# Patient Record
Sex: MALE | Race: BLACK OR AFRICAN AMERICAN | NOT HISPANIC OR LATINO | URBAN - METROPOLITAN AREA
[De-identification: names, ages, dates, MRNs, and addresses within clinical notes are randomized per-mention and may not be internally consistent; named-entity substitution may affect disease eponyms.]

---

## 2020-07-20 ENCOUNTER — APPOINTMENT (RX ONLY)
Dept: URBAN - METROPOLITAN AREA CLINIC 1 | Facility: CLINIC | Age: 50
Setting detail: DERMATOLOGY
End: 2020-07-20

## 2020-07-20 DIAGNOSIS — L21.8 OTHER SEBORRHEIC DERMATITIS: ICD-10-CM

## 2020-07-20 DIAGNOSIS — Q828 OTHER SPECIFIED ANOMALIES OF SKIN: ICD-10-CM

## 2020-07-20 DIAGNOSIS — B07.8 OTHER VIRAL WARTS: ICD-10-CM

## 2020-07-20 DIAGNOSIS — Q826 OTHER SPECIFIED ANOMALIES OF SKIN: ICD-10-CM

## 2020-07-20 DIAGNOSIS — Q819 OTHER SPECIFIED ANOMALIES OF SKIN: ICD-10-CM

## 2020-07-20 PROBLEM — L85.8 OTHER SPECIFIED EPIDERMAL THICKENING: Status: ACTIVE | Noted: 2020-07-20

## 2020-07-20 PROBLEM — D23.72 OTHER BENIGN NEOPLASM OF SKIN OF LEFT LOWER LIMB, INCLUDING HIP: Status: ACTIVE | Noted: 2020-07-20

## 2020-07-20 PROCEDURE — 99203 OFFICE O/P NEW LOW 30 MIN: CPT | Mod: 25

## 2020-07-20 PROCEDURE — 17110 DESTRUCTION B9 LES UP TO 14: CPT

## 2020-07-20 PROCEDURE — ? BENIGN DESTRUCTION

## 2020-07-20 PROCEDURE — ? COUNSELING

## 2020-07-20 PROCEDURE — ? PRESCRIPTION

## 2020-07-20 PROCEDURE — ? ADDITIONAL NOTES

## 2020-07-20 PROCEDURE — ? TREATMENT REGIMEN

## 2020-07-20 RX ORDER — HYDROCORTISONE 25 MG/G
CREAM TOPICAL
Qty: 1 | Refills: 2 | Status: ERX | COMMUNITY
Start: 2020-07-20

## 2020-07-20 RX ORDER — CICLOPIROX 10 MG/.96ML
SHAMPOO TOPICAL
Qty: 1 | Refills: 3 | Status: ERX | COMMUNITY
Start: 2020-07-20

## 2020-07-20 RX ORDER — FLUOCINOLONE ACETONIDE 0.11 MG/ML
OIL TOPICAL
Qty: 1 | Refills: 3 | Status: ERX | COMMUNITY
Start: 2020-07-20

## 2020-07-20 RX ADMIN — FLUOCINOLONE ACETONIDE: 0.11 OIL TOPICAL at 00:00

## 2020-07-20 RX ADMIN — CICLOPIROX: 10 SHAMPOO TOPICAL at 00:00

## 2020-07-20 RX ADMIN — HYDROCORTISONE: 25 CREAM TOPICAL at 00:00

## 2020-07-20 ASSESSMENT — LOCATION SIMPLE DESCRIPTION DERM
LOCATION SIMPLE: RIGHT LIP
LOCATION SIMPLE: LEFT UPPER ARM

## 2020-07-20 ASSESSMENT — LOCATION DETAILED DESCRIPTION DERM
LOCATION DETAILED: LEFT ANTERIOR PROXIMAL UPPER ARM
LOCATION DETAILED: RIGHT LOWER CUTANEOUS LIP

## 2020-07-20 ASSESSMENT — LOCATION ZONE DERM
LOCATION ZONE: LIP
LOCATION ZONE: ARM

## 2020-07-20 NOTE — PROCEDURE: TREATMENT REGIMEN
Detail Level: Zone
Plan: Ciclopirox Shampoo. Weekly.   10 mins on, rinse\\nMild shampoo\\nConditioner \\nDermasmooth oil. Qhs \\nHydrocortisone cream 2.5. Qd. Face, ears.  2-3 days prn itch \\nMild wash face. Qd\\nMoisturizer
Plan: 7 mm\\nLn2 today

## 2020-07-20 NOTE — HPI: RASH (SEBORRHEIC DERMATITIS)
How Severe Is It?: moderate
Is This A New Presentation, Or A Follow-Up?: Follow Up Seborrheic Dermatitis
Additional History: Had Rx years. Ago \\nKetoconazole shampoo

## 2020-07-20 NOTE — PROCEDURE: BENIGN DESTRUCTION
Medical Necessity Clause: This procedure was medically necessary because the lesions that were treated were:
Render Note In Bullet Format When Appropriate: No
Detail Level: Detailed
Consent: The patient's consent was obtained including but not limited to risks of crusting, scabbing, blistering, scarring, darker or lighter pigmentary change, recurrence, incomplete removal and infection.
Treatment Number (Will Not Render If 0): 0
Anesthesia Volume In Cc: 0.5
Medical Necessity Information: It is in your best interest to select a reason for this procedure from the list below. All of these items fulfill various CMS LCD requirements except the new and changing color options.
Post-Care Instructions: I reviewed with the patient in detail post-care instructions. Patient is to wear sunprotection, and avoid picking at any of the treated lesions. Pt may apply Vaseline to crusted or scabbing areas.

## 2020-08-13 ENCOUNTER — APPOINTMENT (RX ONLY)
Dept: URBAN - METROPOLITAN AREA CLINIC 33 | Facility: CLINIC | Age: 50
Setting detail: DERMATOLOGY
End: 2020-08-13

## 2020-08-13 DIAGNOSIS — B07.8 OTHER VIRAL WARTS: ICD-10-CM

## 2020-08-13 DIAGNOSIS — L21.8 OTHER SEBORRHEIC DERMATITIS: ICD-10-CM | Status: IMPROVED

## 2020-08-13 PROCEDURE — ? BENIGN DESTRUCTION

## 2020-08-13 PROCEDURE — ? TREATMENT REGIMEN

## 2020-08-13 PROCEDURE — ? ADDITIONAL NOTES

## 2020-08-13 PROCEDURE — 17110 DESTRUCTION B9 LES UP TO 14: CPT

## 2020-08-13 PROCEDURE — 99213 OFFICE O/P EST LOW 20 MIN: CPT | Mod: 25

## 2020-08-13 ASSESSMENT — LOCATION SIMPLE DESCRIPTION DERM
LOCATION SIMPLE: RIGHT LIP
LOCATION SIMPLE: LEFT LIP
LOCATION SIMPLE: RIGHT CHEEK

## 2020-08-13 ASSESSMENT — LOCATION ZONE DERM
LOCATION ZONE: LIP
LOCATION ZONE: FACE

## 2020-08-13 ASSESSMENT — LOCATION DETAILED DESCRIPTION DERM
LOCATION DETAILED: LEFT UPPER CUTANEOUS LIP
LOCATION DETAILED: RIGHT UPPER CUTANEOUS LIP
LOCATION DETAILED: RIGHT SUPERIOR MEDIAL BUCCAL CHEEK
LOCATION DETAILED: RIGHT LOWER CUTANEOUS LIP

## 2020-08-13 NOTE — PROCEDURE: TREATMENT REGIMEN
Detail Level: Zone
Plan: Ciclopirox Shampoo. Weekly.   10 mins on, rinse\\nMild shampoo\\nConditioner \\nDermasmooth oil. Qhs \\nHydrocortisone cream 2.5. Qd. Face, ears.  2-3 days prn itch \\nMild wash face. Qd\\nMoisturizer\\n\\n8/13/2020 improved \\nItching and flaking decreased\\nD/c hydrocortisone cream to face, restart  prn \\nContinue ciclopirox shampoo to scalp weekly\\nContinue Dermasmooth oil to scalp.
Plan: 7 mm\\nLn2 today

## 2020-09-03 ENCOUNTER — APPOINTMENT (RX ONLY)
Dept: URBAN - METROPOLITAN AREA CLINIC 379 | Facility: CLINIC | Age: 50
Setting detail: DERMATOLOGY
End: 2020-09-03

## 2020-09-03 DIAGNOSIS — L21.8 OTHER SEBORRHEIC DERMATITIS: ICD-10-CM | Status: WELL CONTROLLED

## 2020-09-03 DIAGNOSIS — B07.8 OTHER VIRAL WARTS: ICD-10-CM | Status: STABLE

## 2020-09-03 PROCEDURE — 17111 DESTRUCTION B9 LESIONS 15/>: CPT

## 2020-09-03 PROCEDURE — 99213 OFFICE O/P EST LOW 20 MIN: CPT | Mod: 25

## 2020-09-03 PROCEDURE — ? ADDITIONAL NOTES

## 2020-09-03 PROCEDURE — ? TREATMENT REGIMEN

## 2020-09-03 PROCEDURE — ? BENIGN DESTRUCTION

## 2020-09-03 ASSESSMENT — LOCATION SIMPLE DESCRIPTION DERM
LOCATION SIMPLE: LEFT LIP
LOCATION SIMPLE: RIGHT LIP

## 2020-09-03 ASSESSMENT — LOCATION ZONE DERM: LOCATION ZONE: LIP

## 2020-09-03 ASSESSMENT — LOCATION DETAILED DESCRIPTION DERM
LOCATION DETAILED: RIGHT LOWER CUTANEOUS LIP
LOCATION DETAILED: LEFT LOWER CUTANEOUS LIP

## 2020-09-03 NOTE — PROCEDURE: BENIGN DESTRUCTION
Medical Necessity Clause: This procedure was medically necessary because the lesions that were treated were:
Medical Necessity Information: It is in your best interest to select a reason for this procedure from the list below. All of these items fulfill various CMS LCD requirements except the new and changing color options.
Render Post-Care Instructions In Note?: no
Post-Care Instructions: I reviewed with the patient in detail post-care instructions. Patient is to wear sunprotection, and avoid picking at any of the treated lesions. Pt may apply Vaseline to crusted or scabbing areas.
Anesthesia Volume In Cc: 0.5
Detail Level: Detailed
Treatment Number (Will Not Render If 0): 0
Consent: The patient's consent was obtained including but not limited to risks of crusting, scabbing, blistering, scarring, darker or lighter pigmentary change, recurrence, incomplete removal and infection.

## 2020-09-03 NOTE — PROCEDURE: TREATMENT REGIMEN
Detail Level: Zone
Plan: Ciclopirox Shampoo. Weekly.   10 mins on, rinse\\nMild shampoo\\nConditioner \\nDermasmooth oil. Qhs \\nHydrocortisone cream 2.5. Qd. Face, ears.  2-3 days prn itch \\nMild wash face. Qd\\nMoisturizer\\n\\n8/13/2020 improved \\nItching and flaking decreased\\nD/c hydrocortisone cream to face, restart  prn \\nContinue ciclopirox shampoo to scalp weekly\\nContinue Dermasmooth oil to scalp.\\n\\n9/3/2020 controlled \\nContinue ciclopirox shampoo to scalp weekly\\nContinue Dermasmooth oil to scalp.\\nHydrocortisone to face prn flare
Plan: 7 mm\\nLn2 today

## 2020-10-09 ENCOUNTER — APPOINTMENT (RX ONLY)
Dept: URBAN - METROPOLITAN AREA CLINIC 33 | Facility: CLINIC | Age: 50
Setting detail: DERMATOLOGY
End: 2020-10-09

## 2020-10-09 DIAGNOSIS — B07.8 OTHER VIRAL WARTS: ICD-10-CM | Status: INADEQUATELY CONTROLLED

## 2020-10-09 DIAGNOSIS — L21.8 OTHER SEBORRHEIC DERMATITIS: ICD-10-CM | Status: WELL CONTROLLED

## 2020-10-09 PROCEDURE — ? BENIGN DESTRUCTION

## 2020-10-09 PROCEDURE — ? TREATMENT REGIMEN

## 2020-10-09 PROCEDURE — 99213 OFFICE O/P EST LOW 20 MIN: CPT | Mod: 25

## 2020-10-09 PROCEDURE — ? ADDITIONAL NOTES

## 2020-10-09 PROCEDURE — 17111 DESTRUCTION B9 LESIONS 15/>: CPT

## 2020-10-09 ASSESSMENT — LOCATION DETAILED DESCRIPTION DERM
LOCATION DETAILED: RIGHT SUPERIOR MEDIAL BUCCAL CHEEK
LOCATION DETAILED: LEFT UPPER CUTANEOUS LIP
LOCATION DETAILED: RIGHT LOWER CUTANEOUS LIP
LOCATION DETAILED: RIGHT UPPER CUTANEOUS LIP
LOCATION DETAILED: LEFT INFERIOR CENTRAL MALAR CHEEK
LOCATION DETAILED: RIGHT INFERIOR MEDIAL MALAR CHEEK
LOCATION DETAILED: RIGHT INFERIOR CENTRAL MALAR CHEEK
LOCATION DETAILED: LEFT LOWER CUTANEOUS LIP

## 2020-10-09 ASSESSMENT — LOCATION ZONE DERM
LOCATION ZONE: LIP
LOCATION ZONE: FACE

## 2020-10-09 ASSESSMENT — LOCATION SIMPLE DESCRIPTION DERM
LOCATION SIMPLE: LEFT CHEEK
LOCATION SIMPLE: LEFT LIP
LOCATION SIMPLE: RIGHT LIP
LOCATION SIMPLE: RIGHT CHEEK

## 2020-10-09 NOTE — PROCEDURE: TREATMENT REGIMEN
Detail Level: Zone
Plan: Ciclopirox Shampoo. Weekly.   10 mins on, rinse\\nMild shampoo\\nConditioner \\nDermasmooth oil. Qhs \\nHydrocortisone cream 2.5. Qd. Face, ears.  2-3 days prn itch \\nMild wash face. Qd\\nMoisturizer\\n\\n8/13/2020 improved \\nItching and flaking decreased\\nD/c hydrocortisone cream to face, restart  prn \\nContinue ciclopirox shampoo to scalp weekly\\nContinue Dermasmooth oil to scalp.\\n\\n9/3/2020 controlled \\nContinue ciclopirox shampoo to scalp weekly\\nContinue Dermasmooth oil to scalp.\\nHydrocortisone to face prn flare\\n\\n10/9/20 well controlled \\nContinue ciclopirox shampoo to scalp weekly\\nContinue Dermasmooth oil to scalp.\\nHydrocortisone to face prn flare
Plan: 7 mm\\nLn2 today\\n\\n10/9/20 better,\\Lindsey done today \\nRecommended 40% salicylic acid , advised to be careful, can cause hypopigmentation\\nHold on using otc compound w and otc wart treatment